# Patient Record
Sex: FEMALE | Race: BLACK OR AFRICAN AMERICAN | Employment: UNEMPLOYED | URBAN - METROPOLITAN AREA
[De-identification: names, ages, dates, MRNs, and addresses within clinical notes are randomized per-mention and may not be internally consistent; named-entity substitution may affect disease eponyms.]

---

## 2022-09-21 LAB — ANTIBODY: NORMAL

## 2024-07-03 ENCOUNTER — HOSPITAL ENCOUNTER (EMERGENCY)
Age: 39
Discharge: HOME | End: 2024-07-03
Payer: COMMERCIAL

## 2024-07-03 ENCOUNTER — APPOINTMENT (OUTPATIENT)
Dept: GENERAL RADIOLOGY | Age: 39
End: 2024-07-03
Payer: COMMERCIAL

## 2024-07-03 VITALS
RESPIRATION RATE: 16 BRPM | SYSTOLIC BLOOD PRESSURE: 127 MMHG | OXYGEN SATURATION: 100 % | DIASTOLIC BLOOD PRESSURE: 86 MMHG | TEMPERATURE: 99.8 F | HEART RATE: 96 BPM

## 2024-07-03 DIAGNOSIS — J18.9 PNEUMONIA OF LOWER LOBE DUE TO INFECTIOUS ORGANISM, UNSPECIFIED LATERALITY: Primary | ICD-10-CM

## 2024-07-03 DIAGNOSIS — R55 NEAR SYNCOPE: ICD-10-CM

## 2024-07-03 LAB
ALBUMIN SERPL-MCNC: 4.3 GM/DL (ref 3.4–5)
ALP BLD-CCNC: 90 IU/L (ref 40–128)
ALT SERPL-CCNC: 15 U/L (ref 10–40)
ANION GAP SERPL CALCULATED.3IONS-SCNC: 12 MMOL/L (ref 7–16)
AST SERPL-CCNC: 20 IU/L (ref 15–37)
BACTERIA: NEGATIVE /HPF
BASOPHILS ABSOLUTE: 0 K/CU MM
BASOPHILS RELATIVE PERCENT: 0.2 % (ref 0–1)
BILIRUB SERPL-MCNC: 0.3 MG/DL (ref 0–1)
BILIRUBIN, URINE: NEGATIVE MG/DL
BLOOD, URINE: ABNORMAL
BUN SERPL-MCNC: 7 MG/DL (ref 6–23)
CALCIUM SERPL-MCNC: 9.2 MG/DL (ref 8.3–10.6)
CHLORIDE BLD-SCNC: 100 MMOL/L (ref 99–110)
CLARITY, UA: ABNORMAL
CO2: 24 MMOL/L (ref 21–32)
COLOR, UA: YELLOW
CREAT SERPL-MCNC: 0.6 MG/DL (ref 0.6–1.1)
DIFFERENTIAL TYPE: ABNORMAL
EOSINOPHILS ABSOLUTE: 0 K/CU MM
EOSINOPHILS RELATIVE PERCENT: 0 % (ref 0–3)
GFR, ESTIMATED: >90 ML/MIN/1.73M2
GLUCOSE SERPL-MCNC: 94 MG/DL (ref 70–99)
GLUCOSE URINE: NEGATIVE MG/DL
HCT VFR BLD CALC: 36.6 % (ref 37–47)
HEMOGLOBIN: 11.8 GM/DL (ref 12.5–16)
IMMATURE NEUTROPHIL %: 0.3 % (ref 0–0.43)
KETONES, URINE: ABNORMAL MG/DL
LEUKOCYTE ESTERASE, URINE: NEGATIVE
LYMPHOCYTES ABSOLUTE: 0.9 K/CU MM
LYMPHOCYTES RELATIVE PERCENT: 9.3 % (ref 24–44)
MCH RBC QN AUTO: 26.8 PG (ref 27–31)
MCHC RBC AUTO-ENTMCNC: 32.2 % (ref 32–36)
MCV RBC AUTO: 83 FL (ref 78–100)
MONOCYTES ABSOLUTE: 0.8 K/CU MM
MONOCYTES RELATIVE PERCENT: 8.7 % (ref 0–4)
MUCUS: ABNORMAL HPF
NEUTROPHILS ABSOLUTE: 7.7 K/CU MM
NEUTROPHILS RELATIVE PERCENT: 81.5 % (ref 36–66)
NITRITE URINE, QUANTITATIVE: NEGATIVE
NUCLEATED RBC %: 0 %
PDW BLD-RTO: 15.1 % (ref 11.7–14.9)
PH, URINE: 8 (ref 5–8)
PLATELET # BLD: 286 K/CU MM (ref 140–440)
PMV BLD AUTO: 10.9 FL (ref 7.5–11.1)
POTASSIUM SERPL-SCNC: 3.8 MMOL/L (ref 3.5–5.1)
PREGNANCY, SERUM: NEGATIVE
PROTEIN UA: ABNORMAL MG/DL
RBC # BLD: 4.41 M/CU MM (ref 4.2–5.4)
RBC URINE: 28 /HPF (ref 0–6)
SODIUM BLD-SCNC: 136 MMOL/L (ref 135–145)
SPECIFIC GRAVITY UA: 1.02 (ref 1–1.03)
SQUAMOUS EPITHELIAL: 14 /HPF
TOTAL IMMATURE NEUTOROPHIL: 0.03 K/CU MM
TOTAL NUCLEATED RBC: 0 K/CU MM
TOTAL PROTEIN: 7.8 GM/DL (ref 6.4–8.2)
TRICHOMONAS: ABNORMAL /HPF
UROBILINOGEN, URINE: 1 MG/DL (ref 0.2–1)
WBC # BLD: 9.4 K/CU MM (ref 4–10.5)
WBC UA: 1 /HPF (ref 0–5)
YEAST: ABNORMAL /HPF

## 2024-07-03 PROCEDURE — 6370000000 HC RX 637 (ALT 250 FOR IP): Performed by: PHYSICIAN ASSISTANT

## 2024-07-03 PROCEDURE — 85025 COMPLETE CBC W/AUTO DIFF WBC: CPT

## 2024-07-03 PROCEDURE — 80053 COMPREHEN METABOLIC PANEL: CPT

## 2024-07-03 PROCEDURE — 2580000003 HC RX 258: Performed by: PHYSICIAN ASSISTANT

## 2024-07-03 PROCEDURE — 99285 EMERGENCY DEPT VISIT HI MDM: CPT

## 2024-07-03 PROCEDURE — 84703 CHORIONIC GONADOTROPIN ASSAY: CPT

## 2024-07-03 PROCEDURE — 71045 X-RAY EXAM CHEST 1 VIEW: CPT

## 2024-07-03 PROCEDURE — 93005 ELECTROCARDIOGRAM TRACING: CPT | Performed by: STUDENT IN AN ORGANIZED HEALTH CARE EDUCATION/TRAINING PROGRAM

## 2024-07-03 PROCEDURE — 81001 URINALYSIS AUTO W/SCOPE: CPT

## 2024-07-03 RX ORDER — 0.9 % SODIUM CHLORIDE 0.9 %
1000 INTRAVENOUS SOLUTION INTRAVENOUS ONCE
Status: COMPLETED | OUTPATIENT
Start: 2024-07-03 | End: 2024-07-03

## 2024-07-03 RX ORDER — DOXYCYCLINE HYCLATE 100 MG
100 TABLET ORAL 2 TIMES DAILY
Qty: 20 TABLET | Refills: 0 | Status: SHIPPED | OUTPATIENT
Start: 2024-07-03 | End: 2024-07-13

## 2024-07-03 RX ORDER — ALBUTEROL SULFATE 90 UG/1
2 AEROSOL, METERED RESPIRATORY (INHALATION) EVERY 6 HOURS PRN
Qty: 18 G | Refills: 3 | Status: SHIPPED | OUTPATIENT
Start: 2024-07-03

## 2024-07-03 RX ORDER — DOXYCYCLINE HYCLATE 100 MG
100 TABLET ORAL EVERY 12 HOURS SCHEDULED
Status: DISCONTINUED | OUTPATIENT
Start: 2024-07-03 | End: 2024-07-03

## 2024-07-03 RX ORDER — BENZONATATE 100 MG/1
100 CAPSULE ORAL 2 TIMES DAILY PRN
Qty: 20 CAPSULE | Refills: 0 | Status: SHIPPED | OUTPATIENT
Start: 2024-07-03 | End: 2024-07-10

## 2024-07-03 RX ORDER — DOXYCYCLINE HYCLATE 100 MG
100 TABLET ORAL ONCE
Status: COMPLETED | OUTPATIENT
Start: 2024-07-03 | End: 2024-07-03

## 2024-07-03 RX ORDER — ACETAMINOPHEN 500 MG
1000 TABLET ORAL ONCE
Status: COMPLETED | OUTPATIENT
Start: 2024-07-03 | End: 2024-07-03

## 2024-07-03 RX ADMIN — SODIUM CHLORIDE 1000 ML: 9 INJECTION, SOLUTION INTRAVENOUS at 19:11

## 2024-07-03 RX ADMIN — ACETAMINOPHEN 1000 MG: 500 TABLET ORAL at 19:10

## 2024-07-03 RX ADMIN — DOXYCYCLINE HYCLATE 100 MG: 100 TABLET, COATED ORAL at 20:00

## 2024-07-03 ASSESSMENT — PAIN DESCRIPTION - LOCATION
LOCATION: HEAD
LOCATION: HEAD

## 2024-07-03 ASSESSMENT — PAIN - FUNCTIONAL ASSESSMENT
PAIN_FUNCTIONAL_ASSESSMENT: 0-10
PAIN_FUNCTIONAL_ASSESSMENT: 0-10

## 2024-07-03 ASSESSMENT — PAIN SCALES - GENERAL
PAINLEVEL_OUTOF10: 6
PAINLEVEL_OUTOF10: 8
PAINLEVEL_OUTOF10: 6

## 2024-07-03 ASSESSMENT — PAIN DESCRIPTION - DESCRIPTORS: DESCRIPTORS: ACHING

## 2024-07-03 ASSESSMENT — PAIN DESCRIPTION - ORIENTATION: ORIENTATION: RIGHT;LEFT

## 2024-07-03 NOTE — ED TRIAGE NOTES
Pt arrived with a syncopal episode. Pt states she felt like she was going to pass out. States she felt a warm sensation while she was standing up and fell. NKI. Pt a/o denies any pain at this time. C/o headache.

## 2024-07-03 NOTE — ED PROVIDER NOTES
allergies.    FAMILYHISTORY     History reviewed. No pertinent family history.     SOCIAL HISTORY       Social History     Socioeconomic History    Marital status:      Spouse name: None    Number of children: None    Years of education: None    Highest education level: None       SCREENINGS    [place] Coma Scale  Eye Opening: Spontaneous  Best Verbal Response: Oriented  Best Motor Response: Obeys commands  [place] Coma Scale Score: 15      PHYSICAL EXAM       ED Triage Vitals [07/03/24 1610]   BP Temp Temp src Pulse Respirations SpO2 Height Weight   (!) 125/59 99.6 °F (37.6 °C) -- 100 16 100 % -- --      GENERAL APPEARANCE:  Well-developed, well-nourished, no acute distress.  HEAD:  NC/AT.  EYES:  Sclera anicteric.   ENT:  Ears, nose, mouth normal.     NECK:  Supple.  CARDIO:  RRR.  LUNGS:   CTAB.  Respirations unlabored.  ABDOMEN:  Soft, non-distended, non-tender.  BS active.  :  No CVA tenderness.  BACK:  No midline thoracic or lumbar spinal tenderness.    EXTREMITIES:  No acute deformities.   SKIN:  Warm and dry.   NEUROLOGICAL:  Alert and oriented.  PSYCHIATRIC:  Normal mood.     DIAGNOSTIC RESULTS   LABS:    Labs Reviewed   URINALYSIS WITH REFLEX TO CULTURE - Abnormal; Notable for the following components:       Result Value    Clarity, UA SLIGHTLY CLOUDY (*)     Ketones, Urine TRACE (*)     Blood, Urine SMALL NUMBER OR AMOUNT OBSERVED (*)     Protein, UA TRACE (*)     All other components within normal limits   CBC WITH AUTO DIFFERENTIAL - Abnormal; Notable for the following components:    Hemoglobin 11.8 (*)     Hematocrit 36.6 (*)     MCH 26.8 (*)     RDW 15.1 (*)     Neutrophils % 81.5 (*)     Lymphocytes % 9.3 (*)     Monocytes % 8.7 (*)     All other components within normal limits   COMPREHENSIVE METABOLIC PANEL   HCG, SERUM, QUALITATIVE   URINE MICROSCOPIC WITH REFLEX TO CULTURE       When ordered, only abnormal lab results are displayed.  All other labs were within normal range or not

## 2024-07-04 LAB
EKG ATRIAL RATE: 101 BPM
EKG DIAGNOSIS: NORMAL
EKG P AXIS: 37 DEGREES
EKG P-R INTERVAL: 180 MS
EKG Q-T INTERVAL: 326 MS
EKG QRS DURATION: 78 MS
EKG QTC CALCULATION (BAZETT): 422 MS
EKG R AXIS: 48 DEGREES
EKG T AXIS: 39 DEGREES
EKG VENTRICULAR RATE: 101 BPM

## 2024-07-04 PROCEDURE — 93010 ELECTROCARDIOGRAM REPORT: CPT | Performed by: INTERNAL MEDICINE

## 2025-03-28 ENCOUNTER — OFFICE VISIT (OUTPATIENT)
Dept: FAMILY MEDICINE CLINIC | Age: 40
End: 2025-03-28
Payer: COMMERCIAL

## 2025-03-28 VITALS
WEIGHT: 223 LBS | HEIGHT: 68 IN | BODY MASS INDEX: 33.8 KG/M2 | SYSTOLIC BLOOD PRESSURE: 122 MMHG | DIASTOLIC BLOOD PRESSURE: 80 MMHG | OXYGEN SATURATION: 98 % | HEART RATE: 68 BPM

## 2025-03-28 DIAGNOSIS — L75.0 URINARY BODY ODOR: ICD-10-CM

## 2025-03-28 DIAGNOSIS — R53.83 OTHER FATIGUE: ICD-10-CM

## 2025-03-28 DIAGNOSIS — K59.09 OTHER CONSTIPATION: ICD-10-CM

## 2025-03-28 DIAGNOSIS — N91.2 AMENORRHEA: ICD-10-CM

## 2025-03-28 DIAGNOSIS — Z13.220 SCREENING CHOLESTEROL LEVEL: ICD-10-CM

## 2025-03-28 DIAGNOSIS — N89.8 VAGINAL ODOR: ICD-10-CM

## 2025-03-28 DIAGNOSIS — Z12.31 SCREENING MAMMOGRAM FOR BREAST CANCER: ICD-10-CM

## 2025-03-28 DIAGNOSIS — D64.9 ANEMIA, UNSPECIFIED TYPE: ICD-10-CM

## 2025-03-28 DIAGNOSIS — D64.9 ANEMIA, UNSPECIFIED TYPE: Primary | ICD-10-CM

## 2025-03-28 DIAGNOSIS — Z12.4 SCREENING FOR CERVICAL CANCER: ICD-10-CM

## 2025-03-28 DIAGNOSIS — Z23 IMMUNIZATION DUE: ICD-10-CM

## 2025-03-28 DIAGNOSIS — E66.811 CLASS 1 OBESITY DUE TO EXCESS CALORIES WITHOUT SERIOUS COMORBIDITY WITH BODY MASS INDEX (BMI) OF 33.0 TO 33.9 IN ADULT: ICD-10-CM

## 2025-03-28 DIAGNOSIS — E66.09 CLASS 1 OBESITY DUE TO EXCESS CALORIES WITHOUT SERIOUS COMORBIDITY WITH BODY MASS INDEX (BMI) OF 33.0 TO 33.9 IN ADULT: ICD-10-CM

## 2025-03-28 DIAGNOSIS — Z76.89 ENCOUNTER TO ESTABLISH CARE: ICD-10-CM

## 2025-03-28 PROBLEM — O99.019 ANEMIA OF PREGNANCY: Status: RESOLVED | Noted: 2017-01-31 | Resolved: 2025-03-28

## 2025-03-28 PROBLEM — M54.50 CHRONIC BILATERAL LOW BACK PAIN WITHOUT SCIATICA: Status: ACTIVE | Noted: 2022-09-21

## 2025-03-28 PROBLEM — N76.2 VULVITIS: Status: RESOLVED | Noted: 2025-03-28 | Resolved: 2025-03-28

## 2025-03-28 PROBLEM — M54.16 LUMBAR RADICULOPATHY: Status: ACTIVE | Noted: 2020-10-12

## 2025-03-28 PROBLEM — O36.60X0 EXCESSIVE FETAL GROWTH AFFECTING MANAGEMENT OF MOTHER: Status: ACTIVE | Noted: 2025-03-28

## 2025-03-28 PROBLEM — N76.2 VULVITIS: Status: ACTIVE | Noted: 2025-03-28

## 2025-03-28 PROBLEM — N93.9 ABNORMAL UTERINE BLEEDING: Status: ACTIVE | Noted: 2021-08-09

## 2025-03-28 PROBLEM — G62.9 NEUROPATHY: Status: ACTIVE | Noted: 2022-09-21

## 2025-03-28 PROBLEM — G89.29 CHRONIC BILATERAL LOW BACK PAIN WITHOUT SCIATICA: Status: ACTIVE | Noted: 2022-09-21

## 2025-03-28 PROBLEM — O36.60X0 EXCESSIVE FETAL GROWTH AFFECTING MANAGEMENT OF MOTHER: Status: RESOLVED | Noted: 2025-03-28 | Resolved: 2025-03-28

## 2025-03-28 PROBLEM — A74.9 CHLAMYDIAL INFECTION: Status: RESOLVED | Noted: 2017-01-17 | Resolved: 2025-03-28

## 2025-03-28 PROBLEM — O99.019 ANEMIA OF PREGNANCY: Status: ACTIVE | Noted: 2017-01-31

## 2025-03-28 PROBLEM — A74.9 CHLAMYDIAL INFECTION: Status: ACTIVE | Noted: 2017-01-17

## 2025-03-28 PROBLEM — D21.9 FIBROID: Chronic | Status: ACTIVE | Noted: 2025-03-28

## 2025-03-28 PROBLEM — N93.9 ABNORMAL UTERINE BLEEDING: Status: RESOLVED | Noted: 2021-08-09 | Resolved: 2025-03-28

## 2025-03-28 LAB
ALBUMIN SERPL-MCNC: 4.2 G/DL (ref 3.4–5)
ALBUMIN/GLOB SERPL: 1.6 {RATIO} (ref 1.1–2.2)
ALP SERPL-CCNC: 74 U/L (ref 40–129)
ALT SERPL-CCNC: 15 U/L (ref 10–40)
ANION GAP SERPL CALCULATED.3IONS-SCNC: 9 MMOL/L (ref 3–16)
AST SERPL-CCNC: 18 U/L (ref 15–37)
BACTERIA URNS QL MICRO: NORMAL /HPF
BASOPHILS # BLD: 0 K/UL (ref 0–0.2)
BASOPHILS NFR BLD: 0.3 %
BILIRUB SERPL-MCNC: <0.2 MG/DL (ref 0–1)
BILIRUB UR QL STRIP.AUTO: NEGATIVE
BUN SERPL-MCNC: 10 MG/DL (ref 7–20)
CALCIUM SERPL-MCNC: 10 MG/DL (ref 8.3–10.6)
CHLORIDE SERPL-SCNC: 105 MMOL/L (ref 99–110)
CHOLEST SERPL-MCNC: 222 MG/DL (ref 0–199)
CLARITY UR: CLEAR
CO2 SERPL-SCNC: 26 MMOL/L (ref 21–32)
COLOR UR: YELLOW
CREAT SERPL-MCNC: 0.5 MG/DL (ref 0.6–1.1)
DEPRECATED RDW RBC AUTO: 15 % (ref 12.4–15.4)
EOSINOPHIL # BLD: 0.1 K/UL (ref 0–0.6)
EOSINOPHIL NFR BLD: 1.3 %
EPI CELLS #/AREA URNS AUTO: 5 /HPF (ref 0–5)
FERRITIN SERPL IA-MCNC: 56.8 NG/ML (ref 15–150)
FOLATE SERPL-MCNC: 9.45 NG/ML (ref 4.78–24.2)
GFR SERPLBLD CREATININE-BSD FMLA CKD-EPI: >90 ML/MIN/{1.73_M2}
GLUCOSE SERPL-MCNC: 89 MG/DL (ref 70–99)
GLUCOSE UR STRIP.AUTO-MCNC: NEGATIVE MG/DL
HCT VFR BLD AUTO: 34.1 % (ref 36–48)
HDLC SERPL-MCNC: 70 MG/DL (ref 40–60)
HGB BLD-MCNC: 11.2 G/DL (ref 12–16)
HGB UR QL STRIP.AUTO: NEGATIVE
HYALINE CASTS #/AREA URNS AUTO: 0 /LPF (ref 0–8)
IRON SATN MFR SERPL: 14 % (ref 15–50)
IRON SERPL-MCNC: 43 UG/DL (ref 37–145)
KETONES UR STRIP.AUTO-MCNC: NEGATIVE MG/DL
LDLC SERPL CALC-MCNC: 139 MG/DL
LEUKOCYTE ESTERASE UR QL STRIP.AUTO: ABNORMAL
LYMPHOCYTES # BLD: 2.2 K/UL (ref 1–5.1)
LYMPHOCYTES NFR BLD: 47.2 %
MCH RBC QN AUTO: 27.1 PG (ref 26–34)
MCHC RBC AUTO-ENTMCNC: 32.9 G/DL (ref 31–36)
MCV RBC AUTO: 82.5 FL (ref 80–100)
MONOCYTES # BLD: 0.2 K/UL (ref 0–1.3)
MONOCYTES NFR BLD: 5.2 %
NEUTROPHILS # BLD: 2.1 K/UL (ref 1.7–7.7)
NEUTROPHILS NFR BLD: 46 %
NITRITE UR QL STRIP.AUTO: NEGATIVE
PH UR STRIP.AUTO: 5.5 [PH] (ref 5–8)
PLATELET # BLD AUTO: 305 K/UL (ref 135–450)
PMV BLD AUTO: 9 FL (ref 5–10.5)
POTASSIUM SERPL-SCNC: 4.2 MMOL/L (ref 3.5–5.1)
PROT SERPL-MCNC: 6.9 G/DL (ref 6.4–8.2)
PROT UR STRIP.AUTO-MCNC: NEGATIVE MG/DL
RBC # BLD AUTO: 4.13 M/UL (ref 4–5.2)
RBC CLUMPS #/AREA URNS AUTO: 1 /HPF (ref 0–4)
SODIUM SERPL-SCNC: 140 MMOL/L (ref 136–145)
SP GR UR STRIP.AUTO: 1.02 (ref 1–1.03)
TIBC SERPL-MCNC: 299 UG/DL (ref 260–445)
TRIGL SERPL-MCNC: 65 MG/DL (ref 0–150)
TSH SERPL DL<=0.005 MIU/L-ACNC: 0.74 UIU/ML (ref 0.27–4.2)
UA COMPLETE W REFLEX CULTURE PNL UR: ABNORMAL
UA DIPSTICK W REFLEX MICRO PNL UR: YES
URN SPEC COLLECT METH UR: ABNORMAL
UROBILINOGEN UR STRIP-ACNC: 0.2 E.U./DL
VIT B12 SERPL-MCNC: 436 PG/ML (ref 211–911)
VLDLC SERPL CALC-MCNC: 13 MG/DL
WBC # BLD AUTO: 4.6 K/UL (ref 4–11)
WBC #/AREA URNS AUTO: 3 /HPF (ref 0–5)

## 2025-03-28 PROCEDURE — 99204 OFFICE O/P NEW MOD 45 MIN: CPT

## 2025-03-28 PROCEDURE — G0447 BEHAVIOR COUNSEL OBESITY 15M: HCPCS

## 2025-03-28 ASSESSMENT — PATIENT HEALTH QUESTIONNAIRE - PHQ9
SUM OF ALL RESPONSES TO PHQ QUESTIONS 1-9: 0
2. FEELING DOWN, DEPRESSED OR HOPELESS: NOT AT ALL
SUM OF ALL RESPONSES TO PHQ QUESTIONS 1-9: 0
1. LITTLE INTEREST OR PLEASURE IN DOING THINGS: NOT AT ALL
SUM OF ALL RESPONSES TO PHQ QUESTIONS 1-9: 0
SUM OF ALL RESPONSES TO PHQ QUESTIONS 1-9: 0

## 2025-03-28 ASSESSMENT — ENCOUNTER SYMPTOMS
VOMITING: 0
SHORTNESS OF BREATH: 0
ANAL BLEEDING: 0
NAUSEA: 0
BACK PAIN: 1
ABDOMINAL PAIN: 0
DIARRHEA: 0
BLOOD IN STOOL: 0
CONSTIPATION: 1

## 2025-03-28 NOTE — PROGRESS NOTES
Joseph Potter PA-C  [phone]    Keerthi Braxton  [id number]  40 y.o.  1985    Chief Complaint:  Chief Complaint   Patient presents with    New Patient    Discuss Labs       HPI:  Keerthi is a 40 y.o. female, former patient of PCP in [place], who presents today for evaluation and management of fatigue, chills, amenorrhea, urine/vaginal odor, obesity, and to establish care. She speaks fluent English.    Fatigue/chills x 4 months - History of iron deficiency anemia requiring oral supplementation.    Amenorrhea x 4 months.  History of fibroids and iron deficiency anemia.  Reports normal paps in the past.    Urinary/vaginal odor - intermittent. Hx STI. Neg hep c and HIV tests in care everywhere.    Constipation - Sometimes 1BM every 4-5 days, requires straining. Reports poor hydration and lack of fiber in diet.    Lumbar pain - uses topical anti-inflammatories as needed.  She tries to avoid oral anti-inflammatories.    Assessment and Medical Decision Makin. Anemia, unspecified type  Assessment & Plan:  History of iron deficiency anemia.  Check TSH/T4, CBC, iron/ferritin, B12/folate. Follow up 3 mo  Orders:  -     CBC with Auto Differential; Future  -     Iron and TIBC; Future  -     Ferritin; Future  -     Vitamin B12 & Folate; Future  -     [facility] - Brenda Bolaños, CNP, OB-GYN, [place]  2. Class 1 obesity due to excess calories without serious comorbidity with body mass index (BMI) of 33.0 to 33.9 in adult  Assessment & Plan:  Encourage lifestyle changes.    Keerthi Braxton was counseled today for 15 minutes directly about obesity and optimization of BMI.  Discussion on how this is directly affecting her co-morbidities and the benefits of optimizing BMI to improve her treatment was identified.  Goal for BMI of 28 or less was set.  Dietary considerations include nutritional balance, total caloric reduction in collaboration with regular exercise routine was detailed.  Surgical and non-surgical interventions

## 2025-03-28 NOTE — PATIENT INSTRUCTIONS
Benefiber daily supplement (powder or gummy)  Miralax powder as needed when you get backed up  Goal one bowel movement every day  
No

## 2025-03-28 NOTE — ASSESSMENT & PLAN NOTE
Recommend increasing fluids, fiber in diet, Benefiber daily supplement (powder or gummy) and Miralax powder prn, titrate to goal of 1 BM daily

## 2025-03-28 NOTE — ASSESSMENT & PLAN NOTE
Encourage lifestyle changes.    Keerthi Braxton was counseled today for 15 minutes directly about obesity and optimization of BMI.  Discussion on how this is directly affecting her co-morbidities and the benefits of optimizing BMI to improve her treatment was identified.  Goal for BMI of 28 or less was set.  Dietary considerations include nutritional balance, total caloric reduction in collaboration with regular exercise routine was detailed.  Surgical and non-surgical interventions were reviewed.  Specific medications that can be used to assist through this process were discussed.  Patient was strongly advised to achieve the above goals and make lifestyle changes accordingly.   Chronic, not at goal (unstable),   and lifestyle modifications recommended

## 2025-03-29 LAB
BV BACTERIA DNA VAG QL NAA+PROBE: NOT DETECTED
C GLABRATA DNA VAG QL NAA+PROBE: NORMAL
C GLABRATA DNA VAG QL NAA+PROBE: NOT DETECTED
C KRUSEI DNA VAG QL NAA+PROBE: NOT DETECTED
CANDIDA DNA VAG QL NAA+PROBE: NOT DETECTED
T VAGINALIS DNA VAG QL NAA+PROBE: NOT DETECTED

## 2025-03-31 LAB
C TRACH DNA UR QL NAA+PROBE: NEGATIVE
N GONORRHOEA DNA UR QL NAA+PROBE: NEGATIVE

## 2025-04-01 ENCOUNTER — RESULTS FOLLOW-UP (OUTPATIENT)
Dept: FAMILY MEDICINE CLINIC | Age: 40
End: 2025-04-01

## 2025-04-01 DIAGNOSIS — D64.9 ANEMIA, UNSPECIFIED TYPE: Primary | ICD-10-CM

## 2025-04-01 RX ORDER — FERROUS SULFATE 325(65) MG
325 TABLET, DELAYED RELEASE (ENTERIC COATED) ORAL EVERY OTHER DAY
Qty: 45 TABLET | Refills: 1 | Status: SHIPPED | OUTPATIENT
Start: 2025-04-01

## 2025-06-25 ENCOUNTER — HOSPITAL ENCOUNTER (OUTPATIENT)
Age: 40
Setting detail: SPECIMEN
Discharge: HOME | End: 2025-06-25
Payer: COMMERCIAL

## 2025-06-25 ENCOUNTER — OFFICE VISIT (OUTPATIENT)
Dept: OBGYN | Age: 40
End: 2025-06-25
Payer: COMMERCIAL

## 2025-06-25 VITALS
BODY MASS INDEX: 34.95 KG/M2 | DIASTOLIC BLOOD PRESSURE: 66 MMHG | HEIGHT: 68 IN | HEART RATE: 61 BPM | WEIGHT: 230.6 LBS | SYSTOLIC BLOOD PRESSURE: 123 MMHG

## 2025-06-25 DIAGNOSIS — N91.2 AMENORRHEA: ICD-10-CM

## 2025-06-25 DIAGNOSIS — D21.9 FIBROID: ICD-10-CM

## 2025-06-25 DIAGNOSIS — Z01.419 ENCOUNTER FOR WELL WOMAN EXAM WITH ROUTINE GYNECOLOGICAL EXAM: Primary | ICD-10-CM

## 2025-06-25 DIAGNOSIS — Z12.31 SCREENING MAMMOGRAM FOR BREAST CANCER: ICD-10-CM

## 2025-06-25 PROCEDURE — G0123 SCREEN CERV/VAG THIN LAYER: HCPCS

## 2025-06-25 PROCEDURE — 99386 PREV VISIT NEW AGE 40-64: CPT | Performed by: OBSTETRICS & GYNECOLOGY

## 2025-06-25 PROCEDURE — 87624 HPV HI-RISK TYP POOLED RSLT: CPT

## 2025-06-25 PROCEDURE — 87591 N.GONORRHOEAE DNA AMP PROB: CPT

## 2025-06-25 PROCEDURE — 87491 CHLMYD TRACH DNA AMP PROBE: CPT

## 2025-06-25 PROCEDURE — 36415 COLL VENOUS BLD VENIPUNCTURE: CPT | Performed by: OBSTETRICS & GYNECOLOGY

## 2025-06-25 SDOH — ECONOMIC STABILITY: FOOD INSECURITY: WITHIN THE PAST 12 MONTHS, YOU WORRIED THAT YOUR FOOD WOULD RUN OUT BEFORE YOU GOT MONEY TO BUY MORE.: NEVER TRUE

## 2025-06-25 SDOH — ECONOMIC STABILITY: FOOD INSECURITY: WITHIN THE PAST 12 MONTHS, THE FOOD YOU BOUGHT JUST DIDN'T LAST AND YOU DIDN'T HAVE MONEY TO GET MORE.: NEVER TRUE

## 2025-06-25 NOTE — PROGRESS NOTES
25    Keerthi Braxton  1985    Chief Complaint   Patient presents with    New Patient     Pt referral from [facility]/nicholas amenorrhea, anemia and routine exam. Labs collected 3/28/2025.     Annual Exam     Annual exam. Non-smoker No known h/o dvt. Patient Patient's last menstrual period was 2024..Periods are irregular.Patient is sexually active. Patient is not on birth control. Pap Smear was  x 2 yrs -[place]. Patient has not had a recent mammogram. Patient complains of amenia, amenorrhea and fibroids.          Keerthi Braxton is a 40 y.o. female who presents today for evaluation of see above.    Past Medical History:   Diagnosis Date    Abnormal uterine bleeding 2021    Anemia of pregnancy 2017    Iron QD      Chlamydial infection 2017    RX zithromax      Excessive fetal growth affecting management of mother 2025    Fibroid     Menorrhagia     Obesity     Vulvitis 2025       Past Surgical History:   Procedure Laterality Date    HYSTEROSCOPY         Social History     Tobacco Use    Smoking status: Never    Smokeless tobacco: Never   Vaping Use    Vaping status: Never Used   Substance Use Topics    Alcohol use: Never    Drug use: Never       Family History   Problem Relation Age of Onset    Hypertension Father     Hypertension Mother        Current Outpatient Medications   Medication Sig Dispense Refill    ferrous sulfate (FE TABS) 325 (65 Fe) MG EC tablet Take 1 tablet by mouth every other day 45 tablet 1     No current facility-administered medications for this visit.       No Known Allergies        Immunization History   Administered Date(s) Administered    Hepatitis B vaccine 2002, 2002, 2003    Influenza Virus Vaccine 2019    Measles/Rubella 2002, 2002    Poliovirus, IPOL, (age 6w+), SC/IM, 0.5mL 2002, 2002, 2003, 2004    TD 5LF, TENIVAC, (age 7y+), IM, 0.5mL 2002, 2002, 2004

## 2025-06-27 LAB
COMMENT: NORMAL
HPV OTHER HR TYPES: NORMAL
HPV TYPE 16: NORMAL
HPV TYPE 18: NORMAL

## 2025-06-30 LAB
C TRACH SPEC QL CULT: NEGATIVE
COMMENT: NORMAL
GYNECOLOGY CYTOLOGY REPORT: NORMAL
HPV OTHER HR TYPES: NOT DETECTED
HPV TYPE 16: NOT DETECTED
HPV TYPE 18: NOT DETECTED
NEISSERIA GONORRHOEAE, CULTURE: NEGATIVE

## 2025-07-02 ENCOUNTER — TELEPHONE (OUTPATIENT)
Dept: CARDIOLOGY CLINIC | Age: 40
End: 2025-07-02

## 2025-07-02 ENCOUNTER — OFFICE VISIT (OUTPATIENT)
Dept: FAMILY MEDICINE CLINIC | Age: 40
End: 2025-07-02
Payer: COMMERCIAL

## 2025-07-02 VITALS
HEIGHT: 68 IN | WEIGHT: 229.5 LBS | BODY MASS INDEX: 34.78 KG/M2 | SYSTOLIC BLOOD PRESSURE: 122 MMHG | DIASTOLIC BLOOD PRESSURE: 80 MMHG

## 2025-07-02 DIAGNOSIS — R07.9 CHEST PAIN, UNSPECIFIED TYPE: Primary | ICD-10-CM

## 2025-07-02 DIAGNOSIS — D50.8 IRON DEFICIENCY ANEMIA SECONDARY TO INADEQUATE DIETARY IRON INTAKE: ICD-10-CM

## 2025-07-02 DIAGNOSIS — E78.2 MIXED HYPERLIPIDEMIA: ICD-10-CM

## 2025-07-02 DIAGNOSIS — E66.09 CLASS 1 OBESITY DUE TO EXCESS CALORIES WITHOUT SERIOUS COMORBIDITY WITH BODY MASS INDEX (BMI) OF 34.0 TO 34.9 IN ADULT: ICD-10-CM

## 2025-07-02 DIAGNOSIS — R06.02 SHORTNESS OF BREATH: ICD-10-CM

## 2025-07-02 DIAGNOSIS — E66.811 CLASS 1 OBESITY DUE TO EXCESS CALORIES WITHOUT SERIOUS COMORBIDITY WITH BODY MASS INDEX (BMI) OF 34.0 TO 34.9 IN ADULT: ICD-10-CM

## 2025-07-02 DIAGNOSIS — N91.2 AMENORRHEA: ICD-10-CM

## 2025-07-02 PROBLEM — D50.9 IRON DEFICIENCY ANEMIA: Status: ACTIVE | Noted: 2020-03-24

## 2025-07-02 PROBLEM — D50.9 IRON DEFICIENCY ANEMIA: Status: ACTIVE | Noted: 2022-10-11

## 2025-07-02 PROCEDURE — G2211 COMPLEX E/M VISIT ADD ON: HCPCS

## 2025-07-02 PROCEDURE — 93000 ELECTROCARDIOGRAM COMPLETE: CPT

## 2025-07-02 PROCEDURE — 99214 OFFICE O/P EST MOD 30 MIN: CPT

## 2025-07-02 RX ORDER — FERROUS SULFATE 325(65) MG
325 TABLET, DELAYED RELEASE (ENTERIC COATED) ORAL EVERY OTHER DAY
Qty: 45 TABLET | Refills: 1 | Status: SHIPPED | OUTPATIENT
Start: 2025-07-02

## 2025-07-02 NOTE — TELEPHONE ENCOUNTER
Left a VM for pt to call back and schedule a consult visit with the first available provider.     Referral by Dr. Potter   DX:     R07.9 (ICD-10-CM) - Chest pain, unspecified type  R06.02 (ICD-10-CM) - Shortness of breath  E66.811, E66.09, Z68.34 (ICD-10-CM) - Class 1 obesity due to excess calories without serious comorbidity with body mass index (BMI) of 34.0 to 34.9 in adult  E78.2 (ICD-10-CM) - Mixed hyperlipidemia        First attempt at contacting patient; 07/02/2025  NO access to my chart.

## 2025-07-02 NOTE — PROGRESS NOTES
of Food in the Last Year: Never true   Transportation Needs: No Transportation Needs (6/25/2025)    PRAPARE - Transportation     Lack of Transportation (Medical): No     Lack of Transportation (Non-Medical): No   Physical Activity: Not on file   Stress: Not on file   Social Connections: Not on file   Intimate Partner Violence: Not on file   Housing Stability: Low Risk  (6/25/2025)    Housing Stability Vital Sign     Unable to Pay for Housing in the Last Year: No     Number of Times Moved in the Last Year: 0     Homeless in the Last Year: No       Current Outpatient Medications   Medication Sig Dispense Refill    ferrous sulfate (FE TABS) 325 (65 Fe) MG EC tablet Take 1 tablet by mouth every other day 45 tablet 1     No current facility-administered medications for this visit.       Allergies:  No Known Allergies    Review of Systems:  Review of Systems   Constitutional:  Positive for fatigue. Negative for appetite change.   Eyes:  Positive for visual disturbance.   Respiratory:  Positive for shortness of breath.    Cardiovascular:  Positive for chest pain. Negative for leg swelling.   Gastrointestinal:  Negative for abdominal pain, nausea and vomiting.   Skin:  Negative for rash.   Neurological:  Positive for dizziness and light-headedness. Negative for weakness and headaches.   Psychiatric/Behavioral:  Negative for dysphoric mood. The patient is not nervous/anxious.        Physical Exam:  Vitals:    07/02/25 0933   BP: 122/80     BP Readings from Last 3 Encounters:   07/05/25 108/68   07/02/25 122/80   06/25/25 123/66      Wt Readings from Last 3 Encounters:   07/05/25 103.4 kg (228 lb)   07/02/25 104.1 kg (229 lb 8 oz)   06/25/25 104.6 kg (230 lb 9.6 oz)       Physical Exam  Vitals and nursing note reviewed.   Constitutional:       General: She is not in acute distress.     Appearance: Normal appearance. She is obese. She is not ill-appearing.   HENT:      Head: Normocephalic and atraumatic.   Cardiovascular:

## 2025-07-05 ENCOUNTER — INITIAL CONSULT (OUTPATIENT)
Dept: CARDIOLOGY CLINIC | Age: 40
End: 2025-07-05
Payer: COMMERCIAL

## 2025-07-05 VITALS
SYSTOLIC BLOOD PRESSURE: 108 MMHG | DIASTOLIC BLOOD PRESSURE: 68 MMHG | WEIGHT: 228 LBS | HEIGHT: 68 IN | HEART RATE: 69 BPM | BODY MASS INDEX: 34.56 KG/M2

## 2025-07-05 DIAGNOSIS — E66.811 OBESITY (BMI 30.0-34.9): ICD-10-CM

## 2025-07-05 DIAGNOSIS — E78.2 MIXED HYPERLIPIDEMIA: ICD-10-CM

## 2025-07-05 DIAGNOSIS — R07.9 CHEST PAIN, UNSPECIFIED TYPE: Primary | ICD-10-CM

## 2025-07-05 DIAGNOSIS — R00.2 PALPITATIONS: ICD-10-CM

## 2025-07-05 DIAGNOSIS — R55 SYNCOPE, UNSPECIFIED SYNCOPE TYPE: ICD-10-CM

## 2025-07-05 PROCEDURE — 93000 ELECTROCARDIOGRAM COMPLETE: CPT | Performed by: INTERNAL MEDICINE

## 2025-07-05 PROCEDURE — 99204 OFFICE O/P NEW MOD 45 MIN: CPT | Performed by: INTERNAL MEDICINE

## 2025-07-05 NOTE — PROGRESS NOTES
CARDIOLOGY CONSULTATION    Keerthi Braxton  1985    Dear Dr. Potter, Joseph SALGADO, RODO    Thank you for asking me to participate in care of Keerthi Braxton    Chief Complaint   Patient presents with    New Patient    Chest Pain    Varicose Veins     History of present illness:  Keerthi Braxton is a 40 y.o. female is seeing me for the first time for right-sided chest discomfort occasional palpitations and give history of syncope 3 years ago and 1 year ago.  The episode 3 years ago was exertion induced because she was sleeping upstairs heard something was burning in the kitchen came running down with heart racing at a very fast pace and when she came down she says she fainted and fell to the ground but did not seek any medical attention and there was nobody around her and she woke up in was fine and did not go to the hospital.  Last year around this time she was in emergency room with near fainting spell and she was suffering from headache and congestion and cough and cold at the time.  Palpitations occurs rarely once in few months last only for few seconds.  She does not get dizzy with that.  She reports lightheadedness chest tightness off and on not necessarily with any activities however she does not exercise.  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  She does not smoke.  She has history of anemia taking iron supplementation otherwise has been very healthy.      REVIEW OF SYSTEMS:   Constitutional: Denies generalized weakness  Eyes:  Denies change in visual acuity  HENT:  Denies nasal congestion or sore throat  Respiratory:  Denies cough or shortness of breath  Cardiovascular: as in HPI  GI:  Denies abdominal pain, complains of nausea and vomiting  :  Denies dysuria  Musculoskeletal:  Denies back pain or joint pain  Integument:  Denies rash  Neurologic:

## 2025-07-05 NOTE — PROGRESS NOTES
CLINICAL STAFF DOCUMENTATION    Dr. Denise Braxton  1985  [id number]    Have you had any Chest Pain recently? - Yes  If Yes DO EKG   When did the pain begin? - Years   What type of pain is it? - Sharp Pain   Tender to palpate (touch)? No  Does the pain radiate or stay in one place? - goes to back  How long does the pain last? - 1 minutes    How Severe is the pain? - 3  Is there anything that aggravates or triggers the pain? tired  Do you have any other symptoms at the same time? Sweating    DO EKG IF: Patient has a Heart Rate above 100 or below 50 (Ex:A-fib, Aflutter, PAF, SVT, VT, Bradycardia)      CAD (Coronary Artery Disease) patient should have one on file every 6 months     New Consult or New Patient     If patient is following up from a current Stent/PCI     If patient is following up from a current Cardioversion        Have you had any Shortness of Breath - No  Have you had any dizziness - No  Have you had any palpitations recently? - No  Any thyroid issues? - No    Do you have any edema - swelling in No      Is the patient on any of the following medications -   If Yes DO EKG - Needs done every 3 months    When did you have your last labs drawn 2025  What doctor ordered   Do we have the labs in their chart Yes  If we do not have the labs, ask where they were drawn     If we do not have these labs, you are retrieve these labs for the provider!    Do you need any prescriptions refilled? - No    Do you have a surgery or procedure scheduled in the near future - No    Do use tobacco products? - No  Do you drink alcohol? - No  Do you use any illicit drugs? - No  Caffeine? - No

## 2025-07-05 NOTE — ASSESSMENT & PLAN NOTE
Patient describes at least one of the event to be stress-induced.  We will do maximal stress study to evaluate safety of regular exercises as she has been counseled to start exercising and lose weight and she is intending to go and join a exercise program or gym to accomplish this.

## 2025-07-05 NOTE — ASSESSMENT & PLAN NOTE
Borderline hyperlipidemia with low 10-year ASCVD risk for cardiovascular events discussed.  Patient does not qualify for statin therapy.  Questions answered.  Counseled for diet and exercise to lower numbers.

## 2025-07-05 NOTE — ASSESSMENT & PLAN NOTE
Very atypical by description there is no chest wall tenderness.  We will obtain a regular stress test to evaluate further.

## 2025-07-05 NOTE — ASSESSMENT & PLAN NOTE
Counseled for calorie counting, reduction in serving size and exercise and lifestyle modification for weight loss.

## 2025-07-05 NOTE — ASSESSMENT & PLAN NOTE
Palpitations are rare and self-limiting.  We discussed various way of cardiac monitoring and she wants to hold off on this because they are not very frequent.